# Patient Record
Sex: MALE | Race: WHITE
[De-identification: names, ages, dates, MRNs, and addresses within clinical notes are randomized per-mention and may not be internally consistent; named-entity substitution may affect disease eponyms.]

---

## 2018-10-11 ENCOUNTER — HOSPITAL ENCOUNTER (EMERGENCY)
Dept: HOSPITAL 74 - JER | Age: 63
LOS: 1 days | Discharge: HOME | End: 2018-10-12
Payer: COMMERCIAL

## 2018-10-11 VITALS — HEART RATE: 92 BPM | TEMPERATURE: 99 F

## 2018-10-11 VITALS — BODY MASS INDEX: 33.2 KG/M2

## 2018-10-11 DIAGNOSIS — Z79.84: ICD-10-CM

## 2018-10-11 DIAGNOSIS — N20.0: Primary | ICD-10-CM

## 2018-10-11 DIAGNOSIS — E11.9: ICD-10-CM

## 2018-10-11 DIAGNOSIS — N40.0: ICD-10-CM

## 2018-10-11 DIAGNOSIS — Z87.442: ICD-10-CM

## 2018-10-11 DIAGNOSIS — I10: ICD-10-CM

## 2018-10-11 LAB
ALBUMIN SERPL-MCNC: 4.6 G/DL (ref 3.4–5)
ALP SERPL-CCNC: 65 U/L (ref 45–117)
ALT SERPL-CCNC: 47 U/L (ref 13–61)
ANION GAP SERPL CALC-SCNC: 6 MMOL/L (ref 8–16)
APPEARANCE UR: CLEAR
AST SERPL-CCNC: 24 U/L (ref 15–37)
BASOPHILS # BLD: 0.3 % (ref 0–2)
BILIRUB SERPL-MCNC: 0.4 MG/DL (ref 0.2–1)
BILIRUB UR STRIP.AUTO-MCNC: NEGATIVE MG/DL
BUN SERPL-MCNC: 17 MG/DL (ref 7–18)
CALCIUM SERPL-MCNC: 10.1 MG/DL (ref 8.5–10.1)
CHLORIDE SERPL-SCNC: 104 MMOL/L (ref 98–107)
CO2 SERPL-SCNC: 26 MMOL/L (ref 21–32)
COLOR UR: COLORLESS
CREAT SERPL-MCNC: 1.2 MG/DL (ref 0.55–1.3)
DEPRECATED RDW RBC AUTO: 13.2 % (ref 11.9–15.9)
EOSINOPHIL # BLD: 1 % (ref 0–4.5)
EPITH CASTS URNS QL MICRO: (no result) /HPF
GLUCOSE SERPL-MCNC: 204 MG/DL (ref 74–106)
HCT VFR BLD CALC: 43.1 % (ref 35.4–49)
HGB BLD-MCNC: 14.6 GM/DL (ref 11.7–16.9)
KETONES UR QL STRIP: (no result)
LEUKOCYTE ESTERASE UR QL STRIP.AUTO: NEGATIVE
LIPASE SERPL-CCNC: 177 U/L (ref 73–393)
LYMPHOCYTES # BLD: 17.5 % (ref 8–40)
MAGNESIUM SERPL-MCNC: 2 MG/DL (ref 1.8–2.4)
MCH RBC QN AUTO: 29.7 PG (ref 25.7–33.7)
MCHC RBC AUTO-ENTMCNC: 34 G/DL (ref 32–35.9)
MCV RBC: 87.6 FL (ref 80–96)
MONOCYTES # BLD AUTO: 3.2 % (ref 3.8–10.2)
MUCOUS THREADS URNS QL MICRO: (no result)
NEUTROPHILS # BLD: 78 % (ref 42.8–82.8)
NITRITE UR QL STRIP: NEGATIVE
PH UR: 8 [PH] (ref 5–8)
PLATELET # BLD AUTO: 271 K/MM3 (ref 134–434)
PMV BLD: 7.8 FL (ref 7.5–11.1)
POTASSIUM SERPLBLD-SCNC: 3.6 MMOL/L (ref 3.5–5.1)
PROT SERPL-MCNC: 8 G/DL (ref 6.4–8.2)
PROT UR QL STRIP: (no result)
PROT UR QL STRIP: NEGATIVE
RBC # BLD AUTO: 4.92 M/MM3 (ref 4–5.6)
SODIUM SERPL-SCNC: 137 MMOL/L (ref 136–145)
SP GR UR: 1.01 (ref 1.01–1.03)
UROBILINOGEN UR STRIP-MCNC: NEGATIVE MG/DL (ref 0.2–1)
WBC # BLD AUTO: 12.8 K/MM3 (ref 4–10)

## 2018-10-11 PROCEDURE — 3E033GC INTRODUCTION OF OTHER THERAPEUTIC SUBSTANCE INTO PERIPHERAL VEIN, PERCUTANEOUS APPROACH: ICD-10-PCS

## 2018-10-11 PROCEDURE — 3E0333Z INTRODUCTION OF ANTI-INFLAMMATORY INTO PERIPHERAL VEIN, PERCUTANEOUS APPROACH: ICD-10-PCS

## 2018-10-11 PROCEDURE — 3E033NZ INTRODUCTION OF ANALGESICS, HYPNOTICS, SEDATIVES INTO PERIPHERAL VEIN, PERCUTANEOUS APPROACH: ICD-10-PCS

## 2018-10-11 NOTE — PDOC
Attending Attestation





- HPI


HPI: 





10/11/18 22:44


The patient is a 63 year old male with a significant past medical history of DM

, HTN, BPH who presents to ED with complaints of abdominal pain 3 hours ago. 

Patient said he was in the bathroom and when he got up he felt a sharp pain in 

his RLQ. Patient states the pain is a 7/10 and radiates to the R testicle. He 

also reports nausea and 3 episodes of nonbilious vomiting associated with 

present symptoms. Patient also admits to urinary frequency. 


Denies fevers, chills, chest pain, diarrhea, constipation, blood in stool or 

urine, dysuria, weakness, headache. Denies any other symptoms. 








Documentation prepared by Halima Barron, acting as medical scribe for Mihaela Gramajo DO





- Physicial Exam


PE: 





10/11/18 22:45


Constitutional: Awake, alert, oriented.  No acute distress.


Head:  Normocephalic.  Atraumatic


Eyes:  PERRL. EOMI.  Conjunctivae are not pale.


ENT:  Mucous membranes are moist and intact. Posterior pharynx without exudates 

or erythema. Uvula midline.


Neck:  Supple.  Full ROM. No lymphadenopathy.


Cardiovascular:  Regular rate.  Regular rhythm. S1, S2 regular.  Distal pulses 

are 2+ and symmetric.  


Pulmonary/Chest:  No evidence of respiratory distress.  Clear to auscultation 

bilaterally  No wheezing, rales or rhonchi.


Abdominal:  + Right lower quadrant tenderness. Soft and non-distended.  No 

rebound, guarding or rigidity.  No organomegaly. No palpable masses. Good bowel 

sounds.


Back:  + Righ CVA tenderness.


Musculoskeletal:  No edema.  No cyanosis.  No clubbing.  Full range of motion 

in all extremities.  Nocalf tenderness. Radial/pedal pulses are intact and 2+ 

bilaterally


Skin:  Skin is warm and dry.  No petechiae.  No purpura.  


Neurological:  Alert and oriented to person, place, and time.  Cranial nerves II

-XII are grossly intact. Normal speech. Strength is grossly symmetric. No 

sensory deficits.


Psychiatric:  Good eye contact.  Normal interaction, affect and behavior.








<Halima Barron - Last Filed: 10/11/18 22:44>





- Resident


Resident Name: Aspen De La O





- ED Attending Attestation


I have performed the following: I have examined & evaluated the patient, The 

case was reviewed & discussed with the resident, I agree w/resident's findings 

& plan, Exceptions are as noted





- Medical Decision Making





10/11/18 22:00








I, Dr. Mihaela Gramajo, DO, attest that this document has been prepared under 

my direction and personally reviewed by me in its entirety.   I further attest, 

that it accurately reflects all work, treatment, procedures and medical decision

-making performed by me.  


10/11/18 22:39


a/p: 64yo male with acute onset of RLQ pain that radiates to his groin


-pt states pain started after having a BM


-R flank pain


-assoc with n/v


-suspect renal colic


-poss hx of renal colic in the past


-will send labs, ua, CT abd/pelvis without contrast


-will medicate for pain and nausea


-will monitor and reassess


10/11/18 22:41


ua shows blood in the urine





10/11/18 22:41


pt with pain despite morphine - will give toradol


10/12/18 00:29


pt with 3mm uvj stone


has a urologist


takes flomax already for his prostate


will d/c to home with motrin and zofran for nausea


pt ambulatory in the ED with a steady gait


cr stable


ua without uti


stable for d/c to home and outpt follow up with his urologist for further eval 

of his kidney stone





<Mihaela Gramajo - Last Filed: 10/12/18 00:31>

## 2018-10-11 NOTE — PDOC
History of Present Illness





- General


Chief Complaint: Pain


Stated Complaint: PAIN, ACUTE


Time Seen by Provider: 10/11/18 20:48


History Source: Patient


Exam Limitations: No Limitations





- History of Present Illness


Initial Comments: 





10/11/18 21:51


Pt is a 62yo m with with pmh of DM, HTN, BPH presenting to ED with complaints 

of RLQ pain that stated suddenly today 3 hours ago. Pt said he was in the 

bathroom and when he got up he felt a sharp pain in his RLQ. 7/10 radiates to 

the R testicle not exacerbated or alleviated by anything. Associated with 

nausea and nbnb vomiting x3. Admits to urinary frequency. States he was 

diagnosed with a kidney stone 2.5 years ago. Last ate at 6pm. Last bm was 

today. Denies fevers, chills, chst pain, diarrhea, constipation, blood in stool 

or urine, dysuria, weakness, headache. 





PCP: Nathan


PMH: see hpi


PSH: none


Meds: flomax, metformin, amlodipine/benzapril


social: denies


allergies: nkda





Past History





- Past Medical History


Allergies/Adverse Reactions: 


 Allergies











Allergy/AdvReac Type Severity Reaction Status Date / Time


 


No Known Allergies Allergy   Verified 10/11/18 20:51











Home Medications: 


Ambulatory Orders





Amlodipine Besylate/Benazepril [Lotrel 10-20 mg Capsule] 1 cap PO DAILY 10/11/

18 


Metformin HCl [Metformin HCl ER] 500 mg PO BID 10/11/18 


Tamsulosin HCl [Flomax] 0.4 mg PO BID 10/11/18 


Ibuprofen [Motrin -] 600 mg PO TID #21 tablet 10/12/18 


Ondansetron HCl [Zofran] 4 mg PO TID #21 tablet 10/12/18 








COPD: No


Diabetes: Yes


HTN: Yes





- Suicide/Smoking/Psychosocial Hx


Smoking History: Never smoked





**Review of Systems





- Review of Systems


Constitutional: No: Chills, Fever, Weakness


HEENTM: No: Recent change in vision


Respiratory: No: Cough, Shortness of Breath, Hemoptysis


Cardiac (ROS): No: Chest Pain, Lightheadedness, Palpitations, Syncope


ABD/GI: Yes: Nausea, Vomiting, Abdominal cramping (RLQ).  No: Blood Streaked 

Bowels, Constipated, Diarrhea, Rectal Bleeding


: Yes: Testicular Pain (R testicle from RLQ).  No: Dysuria, Frequency, Flank 

Pain, Hematuria


Musculoskeletal: No: Back Pain, Muscle Pain, Muscle Weakness


Neurological: No: Headache, Numbness, Paresthesia, Tingling, Tremors





*Physical Exam





- Vital Signs


 Last Vital Signs











Temp Pulse Resp BP Pulse Ox


 


 99 F   92 H  18   155/96   98 


 


 10/11/18 20:49  10/11/18 20:49  10/11/18 20:49  10/11/18 20:49  10/11/18 20:49














- Physical Exam


General Appearance: Yes: Nourished, Appropriately Dressed, Moderate Distress


HEENT: positive: EOMI, THERESA, Normal Voice, Pharynx Normal, Hearing Grossly 

Normal.  negative: Pale Conjunctivae, Scleral Icterus (R), Scleral Icterus (L)


Neck: positive: Trachea midline, Supple.  negative: Lymphadenopathy (R), 

Lymphadenopathy (L)


Respiratory/Chest: positive: Lungs Clear, Normal Breath Sounds.  negative: 

Rapid RR, Decreased Breath Sounds, Crackles, Rales, Rhonchi, Stridor


Cardiovascular: positive: Regular Rhythm, Regular Rate, S1, S2.  negative: Edema

, JVD, Murmur


Vascular Pulses: Carotid (R): 2+, Carotid (L): 2+, Dorsalis-Pedis (R): 2+, 

Doralis-Pedis (L): 2+


Gastrointestinal/Abdominal: positive: Normal Bowel Sounds, Soft, Tenderness (RLQ

).  negative: Distended, Guarding, Rebound


Male Genitalia: positive: normal genitalia.  negative: testicular tenderness, 

testicular mass


Musculoskeletal: positive: CVA Tenderness (R).  negative: CVA Tenderness (L), 

Muscle Spasm


Extremity: positive: Normal Capillary Refill


Integumentary: positive: Normal Color, Dry, Warm


Neurologic: positive: CNs II-XII NML intact, Fully Oriented, Alert, Normal Mood/

Affect, Normal Response, Motor Strength 5/5





ED Treatment Course





- LABORATORY


CBC & Chemistry Diagram: 


 10/11/18 21:03





 10/11/18 21:03





- ADDITIONAL ORDERS


Additional order review: 


 Laboratory  Results











  10/11/18





  21:03


 


Urine Color  Colorless


 


Urine Appearance  Clear


 


Urine pH  8.0


 


Ur Specific Gravity  1.007 L


 


Urine Protein  Negative


 


Urine Glucose (UA)  3+ H


 


Urine Ketones  Trace H


 


Urine Blood  2+ H


 


Urine Nitrite  Negative


 


Urine Bilirubin  Negative


 


Urine Urobilinogen  Negative


 


Ur Leukocyte Esterase  Negative


 


Urine WBC (Auto)  4


 


Urine RBC (Auto)  12


 


Ur Epithelial Cells  Rare


 


Urine Mucus  Rare








 











  10/11/18





  21:03


 


RBC  4.92


 


MCV  87.6


 


MCHC  34.0


 


RDW  13.2


 


MPV  7.8


 


Neutrophils %  78.0


 


Lymphocytes %  17.5


 


Monocytes %  3.2 L


 


Eosinophils %  1.0


 


Basophils %  0.3














- RADIOLOGY


Radiology Studies Ordered: 














 Category Date Time Status


 


 SPIRAL- RENAL-STONE CT [CT] Stat CT Scan  10/11/18 21:44 Ordered














Medical Decision Making





- Medical Decision Making





10/12/18 06:38


62yo m with pmh of htn, dm, bph presenting with RLQ abdominal pain sudden onset.


   Vitals: wnl


   PE: RLQ tenderness, R CVA tenderness, normal testicular exam





High suspicion for renal colic given patient presentation and location of pain. 

will consider appendicitis, aaa, cholecystitis


PT given zofran, morhpine. 


Ordered cbc, cmp, lipase, ua, and spiral ct. 


Pt given toradol.





Labs wnl. UA negative for infection.





SPiral ct positive for 3mm stone at R UVJ. Pt reported relief after toradol. UA 

negative for infection, therefore low suspicion for septic stone. 


Symptoms controlled, hemodynamically stable, has good f/u. can  be dc home. Pt 

agreed with plan. Given rx for ibuprofen and zofran and strainer. Pt agreed 

with plan and understood return precautions.  





*DC/Admit/Observation/Transfer


Diagnosis at time of Disposition: 


 Kidney stone on right side








- Discharge Dispostion


Disposition: HOME


Condition at time of disposition: Improved


Decision to Admit order: No





- Prescriptions


Prescriptions: 


Ibuprofen [Motrin -] 600 mg PO TID #21 tablet


Ondansetron HCl [Zofran] 4 mg PO TID #21 tablet





- Referrals


Referrals: 


ON STAFF,NOT [Primary Care Provider] - 





- Patient Instructions


Printed Discharge Instructions:  DI for Kidney Stones


Additional Instructions: 


You were seen here today for evaluation of right lower abdominal pain. The CT 

scan showed you have a kidney stone. 


Please stay hydrated and continue to take Flomax. I have prescribed medications 

for you to take for the pain and nausea. I have also given you a strainer for 

you to use to catch the stone.





Please follow up with your urologist. I recommend you give him or her the 

results of the CT scan. 





Please come back to the emergency room if: pain gets worse, there is blood in 

the urine, you develop fever, you have pain when urinating or if any new 

concerning symptom develops.





Thank you





- Post Discharge Activity

## 2018-10-11 NOTE — PDOC
Rapid Medical Evaluation


Time Seen by Provider: 10/11/18 20:48


Medical Evaluation: 





10/11/18 20:48


The patient complaints of: rlq pain with vomiting x 3 x 2 hours, no fever, 


On brief exam: no cva tenderness, vss, + rlq tenderness


The patient was ordered for:cbc, comp, lipase, ua , ucx, lactic acid, type ad 

screen, blood cx


The patient will proceed to the ED





**Discharge Disposition





- Diagnosis


 Abdominal pain








- Referrals





- Patient Instructions





- Post Discharge Activity

## 2018-10-12 VITALS — SYSTOLIC BLOOD PRESSURE: 134 MMHG | DIASTOLIC BLOOD PRESSURE: 84 MMHG

## 2018-10-13 NOTE — PDOC
Patient Follow-up (Call Back)





- Post ED Follow - Up


Condition at time of discharge: Improved


Disposition at time of original discharge: HOME


Reason for Call Back: Abnwl. Microbiology (Received call from microbiology lab 

reporting mats 1/4 bottles and blood cultures grew gram-positive cocci in 

clusters. This is likely a contaminant. I will wait for official identification 

of organism.)